# Patient Record
Sex: FEMALE | Race: WHITE | NOT HISPANIC OR LATINO
[De-identification: names, ages, dates, MRNs, and addresses within clinical notes are randomized per-mention and may not be internally consistent; named-entity substitution may affect disease eponyms.]

---

## 2017-06-20 ENCOUNTER — APPOINTMENT (OUTPATIENT)
Dept: HEART AND VASCULAR | Facility: CLINIC | Age: 72
End: 2017-06-20

## 2017-06-20 VITALS
BODY MASS INDEX: 30.83 KG/M2 | TEMPERATURE: 97.9 F | HEIGHT: 58.66 IN | OXYGEN SATURATION: 97 % | SYSTOLIC BLOOD PRESSURE: 132 MMHG | DIASTOLIC BLOOD PRESSURE: 76 MMHG | WEIGHT: 150.88 LBS | HEART RATE: 79 BPM

## 2017-06-20 DIAGNOSIS — Z80.9 FAMILY HISTORY OF MALIGNANT NEOPLASM, UNSPECIFIED: ICD-10-CM

## 2017-06-20 DIAGNOSIS — Z82.3 FAMILY HISTORY OF STROKE: ICD-10-CM

## 2017-06-20 DIAGNOSIS — Z87.891 PERSONAL HISTORY OF NICOTINE DEPENDENCE: ICD-10-CM

## 2017-06-20 RX ORDER — AZITHROMYCIN 250 MG/1
250 TABLET, FILM COATED ORAL
Qty: 6 | Refills: 0 | Status: DISCONTINUED | COMMUNITY
Start: 2017-06-13

## 2017-06-20 RX ORDER — AZITHROMYCIN 500 MG/1
500 TABLET, FILM COATED ORAL
Qty: 3 | Refills: 0 | Status: DISCONTINUED | COMMUNITY
Start: 2017-01-26

## 2017-07-20 ENCOUNTER — RX RENEWAL (OUTPATIENT)
Age: 72
End: 2017-07-20

## 2017-07-21 ENCOUNTER — RX RENEWAL (OUTPATIENT)
Age: 72
End: 2017-07-21

## 2017-12-15 ENCOUNTER — APPOINTMENT (OUTPATIENT)
Dept: HEART AND VASCULAR | Facility: CLINIC | Age: 72
End: 2017-12-15
Payer: MEDICARE

## 2017-12-15 VITALS
WEIGHT: 150 LBS | TEMPERATURE: 98.7 F | DIASTOLIC BLOOD PRESSURE: 85 MMHG | SYSTOLIC BLOOD PRESSURE: 140 MMHG | OXYGEN SATURATION: 98 % | HEIGHT: 58.66 IN | BODY MASS INDEX: 30.65 KG/M2 | HEART RATE: 79 BPM

## 2017-12-15 PROCEDURE — 93000 ELECTROCARDIOGRAM COMPLETE: CPT

## 2017-12-15 PROCEDURE — 99214 OFFICE O/P EST MOD 30 MIN: CPT | Mod: 25

## 2017-12-15 RX ORDER — ALBUTEROL SULFATE 90 UG/1
108 (90 BASE) AEROSOL, METERED RESPIRATORY (INHALATION)
Qty: 85 | Refills: 0 | Status: DISCONTINUED | COMMUNITY
Start: 2017-01-17 | End: 2017-12-15

## 2017-12-20 ENCOUNTER — APPOINTMENT (OUTPATIENT)
Dept: HEART AND VASCULAR | Facility: CLINIC | Age: 72
End: 2017-12-20
Payer: MEDICARE

## 2017-12-20 PROCEDURE — 93306 TTE W/DOPPLER COMPLETE: CPT

## 2017-12-20 PROCEDURE — 93225 XTRNL ECG REC<48 HRS REC: CPT

## 2017-12-20 PROCEDURE — ZZZZZ: CPT

## 2017-12-21 ENCOUNTER — RX RENEWAL (OUTPATIENT)
Age: 72
End: 2017-12-21

## 2017-12-21 DIAGNOSIS — Z87.09 PERSONAL HISTORY OF OTHER DISEASES OF THE RESPIRATORY SYSTEM: ICD-10-CM

## 2018-02-12 ENCOUNTER — RX RENEWAL (OUTPATIENT)
Age: 73
End: 2018-02-12

## 2018-07-06 ENCOUNTER — APPOINTMENT (OUTPATIENT)
Dept: HEART AND VASCULAR | Facility: CLINIC | Age: 73
End: 2018-07-06
Payer: MEDICARE

## 2018-07-06 VITALS
OXYGEN SATURATION: 98 % | BODY MASS INDEX: 30.04 KG/M2 | HEIGHT: 59.06 IN | DIASTOLIC BLOOD PRESSURE: 68 MMHG | HEART RATE: 77 BPM | TEMPERATURE: 97.8 F | SYSTOLIC BLOOD PRESSURE: 119 MMHG | WEIGHT: 148.99 LBS

## 2018-07-06 DIAGNOSIS — Z87.09 PERSONAL HISTORY OF OTHER DISEASES OF THE RESPIRATORY SYSTEM: ICD-10-CM

## 2018-07-06 PROCEDURE — 93000 ELECTROCARDIOGRAM COMPLETE: CPT

## 2018-07-06 PROCEDURE — 99213 OFFICE O/P EST LOW 20 MIN: CPT | Mod: 25

## 2018-07-06 RX ORDER — AZITHROMYCIN 250 MG/1
250 TABLET, FILM COATED ORAL
Qty: 6 | Refills: 0 | Status: DISCONTINUED | COMMUNITY
End: 2018-07-06

## 2018-07-19 ENCOUNTER — APPOINTMENT (OUTPATIENT)
Dept: HEART AND VASCULAR | Facility: CLINIC | Age: 73
End: 2018-07-19
Payer: MEDICARE

## 2018-07-19 VITALS
OXYGEN SATURATION: 98 % | HEIGHT: 58.86 IN | SYSTOLIC BLOOD PRESSURE: 130 MMHG | HEART RATE: 75 BPM | TEMPERATURE: 97.8 F | BODY MASS INDEX: 31.04 KG/M2 | DIASTOLIC BLOOD PRESSURE: 80 MMHG | WEIGHT: 154 LBS

## 2018-07-19 PROCEDURE — 99213 OFFICE O/P EST LOW 20 MIN: CPT

## 2018-09-26 ENCOUNTER — RX RENEWAL (OUTPATIENT)
Age: 73
End: 2018-09-26

## 2018-10-11 ENCOUNTER — RX RENEWAL (OUTPATIENT)
Age: 73
End: 2018-10-11

## 2018-10-11 RX ORDER — ALBUTEROL SULFATE 90 UG/1
108 (90 BASE) AEROSOL, METERED RESPIRATORY (INHALATION)
Qty: 3 | Refills: 3 | Status: ACTIVE | COMMUNITY
Start: 1900-01-01 | End: 1900-01-01

## 2018-11-29 ENCOUNTER — APPOINTMENT (OUTPATIENT)
Dept: HEART AND VASCULAR | Facility: CLINIC | Age: 73
End: 2018-11-29
Payer: MEDICARE

## 2018-11-29 VITALS
SYSTOLIC BLOOD PRESSURE: 150 MMHG | DIASTOLIC BLOOD PRESSURE: 100 MMHG | TEMPERATURE: 97.7 F | HEART RATE: 79 BPM | HEIGHT: 58.86 IN | OXYGEN SATURATION: 98 %

## 2018-11-29 PROCEDURE — 99214 OFFICE O/P EST MOD 30 MIN: CPT

## 2018-11-29 PROCEDURE — 93000 ELECTROCARDIOGRAM COMPLETE: CPT

## 2018-11-29 NOTE — HISTORY OF PRESENT ILLNESS
[FreeTextEntry1] : Sore throat x 1-2 days. Associated with dry cough, dry mouth, rhinitis, HA, no sinus pressure, ear pain. No c/o SOB, wheezing, nausea. Has seasonal allergies.\par \par \par Here 7/19/19 with anxiety, wheezing, sweats but not fever or chills.  Wants ProAir\par \par Endo- Dr Vizcaino\par GI- Dr Wynn

## 2018-11-29 NOTE — REASON FOR VISIT
[FreeTextEntry1] : 1 day of left arm pain in the armpit.  No radiation. No N/V. CP.  Pain lasted hrs, awoke much better.  Better with massage.  No arm pain today.  Walked here today without sxs.\par \par \par EKG: NSR, normal axis and intervals, PRWP, no ST-Tw abnormalities.11/29/18

## 2018-11-29 NOTE — PROCEDURE
[Normal] : 3. No pericardial effusion. [de-identified] : Dr. Derick Allen [de-identified] : Nicole Petri-Schoener, KELLIECS [de-identified] : palpitations [de-identified] : 1.2 [de-identified] : 1.2 [de-identified] : 3.3 [de-identified] : 2.3 [de-identified] : 3.3 [de-identified] : 2.9 [de-identified] : 21mmHG [de-identified] : 65-70% [de-identified] : Mild concentric LVH.  Grade I diastolic dysfucntion. [de-identified] : LA volume index: 17 ml/m2 [de-identified] : Mild TR. [de-identified] : IVC: 2.2 [de-identified] : Mild mitral and tricuspid regurgitation [FreeTextEntry1] : : 1945\par Age: 72y\par BP: 140/85\par Height: 61 inches\par Weight: 145 lbs\par BSA: 1.7 m2

## 2018-11-29 NOTE — ASSESSMENT
[FreeTextEntry1] : Arm Pain- sounds non cardiac but provokes a lot of anxiety.  EKG unchanged.  Will treat her elevated BP first and then consider a Stress test\par \par Sore throat: PE with injected throat,  Z jean, warm fluids, tea, cough drops, tylenol PRN for pain, hand washing, rest. Now with wheezing and tremendous anxiety about possibly having PNA.  Will renew ProAir.  Her immunizations are up to date.\par \par HTN- continue Lisinopril 30mg, BP way up at 170/90, adding Norvasc 5mg\par \par HLD- Continue statin

## 2018-11-29 NOTE — PHYSICAL EXAM
[General Appearance - Well Developed] : well developed [Normal Appearance] : normal appearance [Well Groomed] : well groomed [General Appearance - Well Nourished] : well nourished [No Deformities] : no deformities [General Appearance - In No Acute Distress] : no acute distress [Normal Conjunctiva] : the conjunctiva exhibited no abnormalities [Eyelids - No Xanthelasma] : the eyelids demonstrated no xanthelasmas [Normal Oral Mucosa] : normal oral mucosa [No Oral Pallor] : no oral pallor [No Oral Cyanosis] : no oral cyanosis [Normal Oropharynx] : normal oropharynx [Respiration, Rhythm And Depth] : normal respiratory rhythm and effort [Exaggerated Use Of Accessory Muscles For Inspiration] : no accessory muscle use [Auscultation Breath Sounds / Voice Sounds] : lungs were clear to auscultation bilaterally [Heart Rate And Rhythm] : heart rate and rhythm were normal [Heart Sounds] : normal S1 and S2 [Murmurs] : no murmurs present [Abdomen Soft] : soft [Abdomen Tenderness] : non-tender [Abdomen Mass (___ Cm)] : no abdominal mass palpated [Abnormal Walk] : normal gait [Gait - Sufficient For Exercise Testing] : the gait was sufficient for exercise testing [Nail Clubbing] : no clubbing of the fingernails [Cyanosis, Localized] : no localized cyanosis [Petechial Hemorrhages (___cm)] : no petechial hemorrhages [Skin Color & Pigmentation] : normal skin color and pigmentation [] : no rash [No Venous Stasis] : no venous stasis [Skin Lesions] : no skin lesions [No Skin Ulcers] : no skin ulcer [No Xanthoma] : no  xanthoma was observed [FreeTextEntry1] : mobile LNs

## 2018-11-29 NOTE — IMPRESSION
[FreeTextEntry1] : Normal Ejection Fraction and wall motion\par Mild mitral and tricuspid regurgitation\par Study similar to one from 8/31/2015

## 2018-12-14 ENCOUNTER — APPOINTMENT (OUTPATIENT)
Dept: HEART AND VASCULAR | Facility: CLINIC | Age: 73
End: 2018-12-14
Payer: MEDICARE

## 2018-12-14 VITALS — DIASTOLIC BLOOD PRESSURE: 90 MMHG | SYSTOLIC BLOOD PRESSURE: 144 MMHG

## 2018-12-14 VITALS
WEIGHT: 148.99 LBS | BODY MASS INDEX: 30.04 KG/M2 | DIASTOLIC BLOOD PRESSURE: 90 MMHG | TEMPERATURE: 98.1 F | HEIGHT: 58.86 IN | SYSTOLIC BLOOD PRESSURE: 150 MMHG | HEART RATE: 84 BPM | OXYGEN SATURATION: 97 %

## 2018-12-14 PROCEDURE — G0008: CPT

## 2018-12-14 PROCEDURE — 99214 OFFICE O/P EST MOD 30 MIN: CPT

## 2018-12-14 PROCEDURE — 90662 IIV NO PRSV INCREASED AG IM: CPT

## 2018-12-14 PROCEDURE — 93000 ELECTROCARDIOGRAM COMPLETE: CPT

## 2018-12-14 NOTE — ASSESSMENT
[FreeTextEntry1] : Arm Pain- sounds non cardiac but provokes a lot of anxiety.  EKG unchanged.  Will treat her elevated BP first and then consider a Stress test\par \par Palpitations- had either sinus tachycardia or SVT.  Pt told to avoid stressful situations and told not to fly since it is triggering HTN, palps and severe anxiety.  Will add Inderal 10mg for PRN use only.\par \par Sore throat: PE with injected throat,  Z jean, warm fluids, tea, cough drops, tylenol PRN for pain, hand washing, rest. Now with wheezing and tremendous anxiety about possibly having PNA.  Will renew ProAir.  Her immunizations are up to date.\par \par HTN- continue Lisinopril 30mg, BP way up at 170/90, adding Norvasc 5mg.  Gets very anxious, BP still not perfect\par \par HLD- Continue statin

## 2018-12-14 NOTE — PROCEDURE
[Normal] : 3. No pericardial effusion. [de-identified] : Dr. Derick Allen [de-identified] : Nicole Petri-Schoener, KELLIECS [de-identified] : palpitations [de-identified] : 1.2 [de-identified] : 1.2 [de-identified] : 3.3 [de-identified] : 2.3 [de-identified] : 3.3 [de-identified] : 2.9 [de-identified] : 21mmHG [de-identified] : 65-70% [de-identified] : Mild concentric LVH.  Grade I diastolic dysfucntion. [de-identified] : LA volume index: 17 ml/m2 [de-identified] : Mild TR. [de-identified] : IVC: 2.2 [de-identified] : Mild mitral and tricuspid regurgitation [FreeTextEntry1] : : 1945\par Age: 72y\par BP: 140/85\par Height: 61 inches\par Weight: 145 lbs\par BSA: 1.7 m2

## 2018-12-14 NOTE — HISTORY OF PRESENT ILLNESS
[FreeTextEntry1] : Sore throat x 1-2 days. Associated with dry cough, dry mouth, rhinitis, HA, no sinus pressure, ear pain. No c/o SOB, wheezing, nausea. Has seasonal allergies.\par 12/14/18  Upset about an upcoming flight, getting palps and SOB anticipating problems with her knees and hips.  BP up.\par \par \par Here 7/19/19 with anxiety, wheezing, sweats but not fever or chills.  Wants ProAir\par \par Endo- Dr Vizcaino\par GI- Dr Wynn

## 2018-12-14 NOTE — PHYSICAL EXAM
[General Appearance - Well Developed] : well developed [Normal Appearance] : normal appearance [Well Groomed] : well groomed [General Appearance - Well Nourished] : well nourished [No Deformities] : no deformities [General Appearance - In No Acute Distress] : no acute distress [Normal Conjunctiva] : the conjunctiva exhibited no abnormalities [Eyelids - No Xanthelasma] : the eyelids demonstrated no xanthelasmas [Normal Oral Mucosa] : normal oral mucosa [No Oral Pallor] : no oral pallor [No Oral Cyanosis] : no oral cyanosis [Normal Oropharynx] : normal oropharynx [Respiration, Rhythm And Depth] : normal respiratory rhythm and effort [Exaggerated Use Of Accessory Muscles For Inspiration] : no accessory muscle use [Auscultation Breath Sounds / Voice Sounds] : lungs were clear to auscultation bilaterally [Heart Rate And Rhythm] : heart rate and rhythm were normal [Heart Sounds] : normal S1 and S2 [Murmurs] : no murmurs present [Abdomen Soft] : soft [Abdomen Tenderness] : non-tender [Abdomen Mass (___ Cm)] : no abdominal mass palpated [Abnormal Walk] : normal gait [Gait - Sufficient For Exercise Testing] : the gait was sufficient for exercise testing [Nail Clubbing] : no clubbing of the fingernails [Cyanosis, Localized] : no localized cyanosis [Petechial Hemorrhages (___cm)] : no petechial hemorrhages [Skin Color & Pigmentation] : normal skin color and pigmentation [] : no rash [No Venous Stasis] : no venous stasis [Skin Lesions] : no skin lesions [No Skin Ulcers] : no skin ulcer [No Xanthoma] : no  xanthoma was observed [Oriented To Time, Place, And Person] : oriented to person, place, and time [Affect] : the affect was normal [FreeTextEntry1] : Anxious

## 2019-01-30 ENCOUNTER — RX RENEWAL (OUTPATIENT)
Age: 74
End: 2019-01-30

## 2019-03-25 ENCOUNTER — APPOINTMENT (OUTPATIENT)
Dept: HEART AND VASCULAR | Facility: CLINIC | Age: 74
End: 2019-03-25

## 2019-07-08 ENCOUNTER — APPOINTMENT (OUTPATIENT)
Dept: HEART AND VASCULAR | Facility: CLINIC | Age: 74
End: 2019-07-08
Payer: MEDICARE

## 2019-07-08 VITALS
BODY MASS INDEX: 27.29 KG/M2 | HEART RATE: 83 BPM | DIASTOLIC BLOOD PRESSURE: 80 MMHG | OXYGEN SATURATION: 96 % | WEIGHT: 130 LBS | SYSTOLIC BLOOD PRESSURE: 130 MMHG | HEIGHT: 58 IN | TEMPERATURE: 98.4 F

## 2019-07-08 PROCEDURE — 93000 ELECTROCARDIOGRAM COMPLETE: CPT | Mod: NC

## 2019-07-08 PROCEDURE — 36415 COLL VENOUS BLD VENIPUNCTURE: CPT

## 2019-07-08 PROCEDURE — 99214 OFFICE O/P EST MOD 30 MIN: CPT

## 2019-07-08 RX ORDER — AZITHROMYCIN DIHYDRATE 250 MG/1
250 TABLET, FILM COATED ORAL
Refills: 0 | Status: DISCONTINUED | COMMUNITY
End: 2019-07-08

## 2019-07-08 RX ORDER — OSELTAMIVIR PHOSPHATE 75 MG/1
75 CAPSULE ORAL TWICE DAILY
Qty: 10 | Refills: 0 | Status: DISCONTINUED | COMMUNITY
Start: 2017-12-21 | End: 2019-07-08

## 2019-07-08 RX ORDER — PROPRANOLOL HYDROCHLORIDE 10 MG/1
10 TABLET ORAL
Qty: 30 | Refills: 3 | Status: DISCONTINUED | COMMUNITY
End: 2019-07-08

## 2019-07-08 RX ORDER — AZITHROMYCIN 250 MG/1
250 TABLET, FILM COATED ORAL
Qty: 6 | Refills: 0 | Status: DISCONTINUED | COMMUNITY
Start: 2018-07-06 | End: 2019-07-08

## 2019-07-08 RX ORDER — AMLODIPINE BESYLATE 5 MG/1
5 TABLET ORAL
Qty: 90 | Refills: 3 | Status: DISCONTINUED | COMMUNITY
End: 2019-07-08

## 2019-07-08 NOTE — PHYSICAL EXAM
[General Appearance - Well Developed] : well developed [General Appearance - Well Nourished] : well nourished [Well Groomed] : well groomed [Normal Appearance] : normal appearance [General Appearance - In No Acute Distress] : no acute distress [No Deformities] : no deformities [Normal Conjunctiva] : the conjunctiva exhibited no abnormalities [Normal Oral Mucosa] : normal oral mucosa [Eyelids - No Xanthelasma] : the eyelids demonstrated no xanthelasmas [No Oral Pallor] : no oral pallor [No Oral Cyanosis] : no oral cyanosis [Normal Jugular Venous V Waves Present] : normal jugular venous V waves present [Normal Jugular Venous A Waves Present] : normal jugular venous A waves present [Respiration, Rhythm And Depth] : normal respiratory rhythm and effort [No Jugular Venous Welch A Waves] : no jugular venous welch A waves [Heart Sounds] : normal S1 and S2 [Heart Rate And Rhythm] : heart rate and rhythm were normal [Auscultation Breath Sounds / Voice Sounds] : lungs were clear to auscultation bilaterally [Exaggerated Use Of Accessory Muscles For Inspiration] : no accessory muscle use [Murmurs] : no murmurs present [Abdomen Soft] : soft [Abdomen Tenderness] : non-tender [Abnormal Walk] : normal gait [Abdomen Mass (___ Cm)] : no abdominal mass palpated [Cyanosis, Localized] : no localized cyanosis [Nail Clubbing] : no clubbing of the fingernails [Gait - Sufficient For Exercise Testing] : the gait was sufficient for exercise testing [Oriented To Time, Place, And Person] : oriented to person, place, and time [] : no ischemic changes [Petechial Hemorrhages (___cm)] : no petechial hemorrhages [Affect] : the affect was normal [No Anxiety] : not feeling anxious [Mood] : the mood was normal

## 2019-07-09 LAB
25(OH)D3 SERPL-MCNC: 24.1 NG/ML
ALBUMIN SERPL ELPH-MCNC: 4.2 G/DL
ALP BLD-CCNC: 110 U/L
ALT SERPL-CCNC: 15 U/L
ANION GAP SERPL CALC-SCNC: 12 MMOL/L
APPEARANCE: CLEAR
APTT BLD: 30.7 SEC
AST SERPL-CCNC: 16 U/L
BACTERIA: NEGATIVE
BASOPHILS # BLD AUTO: 0.03 K/UL
BASOPHILS NFR BLD AUTO: 0.6 %
BILIRUB SERPL-MCNC: 0.4 MG/DL
BILIRUBIN URINE: NEGATIVE
BLOOD URINE: NEGATIVE
BUN SERPL-MCNC: 16 MG/DL
CALCIUM SERPL-MCNC: 9.4 MG/DL
CHLORIDE SERPL-SCNC: 105 MMOL/L
CHOLEST SERPL-MCNC: 138 MG/DL
CHOLEST/HDLC SERPL: 3.1 RATIO
CO2 SERPL-SCNC: 24 MMOL/L
COLOR: COLORLESS
CREAT SERPL-MCNC: 0.59 MG/DL
EOSINOPHIL # BLD AUTO: 0.05 K/UL
EOSINOPHIL NFR BLD AUTO: 0.9 %
ESTIMATED AVERAGE GLUCOSE: 117 MG/DL
GLUCOSE QUALITATIVE U: NEGATIVE
GLUCOSE SERPL-MCNC: 144 MG/DL
HBA1C MFR BLD HPLC: 5.7 %
HCT VFR BLD CALC: 41.5 %
HDLC SERPL-MCNC: 44 MG/DL
HGB BLD-MCNC: 12.8 G/DL
HYALINE CASTS: 1 /LPF
IMM GRANULOCYTES NFR BLD AUTO: 0.2 %
INR PPP: 1 RATIO
KETONES URINE: NEGATIVE
LDLC SERPL CALC-MCNC: 84 MG/DL
LEUKOCYTE ESTERASE URINE: NEGATIVE
LYMPHOCYTES # BLD AUTO: 1.82 K/UL
LYMPHOCYTES NFR BLD AUTO: 34.1 %
MAN DIFF?: NORMAL
MCHC RBC-ENTMCNC: 28.9 PG
MCHC RBC-ENTMCNC: 30.8 GM/DL
MCV RBC AUTO: 93.7 FL
MICROSCOPIC-UA: NORMAL
MONOCYTES # BLD AUTO: 0.46 K/UL
MONOCYTES NFR BLD AUTO: 8.6 %
NEUTROPHILS # BLD AUTO: 2.96 K/UL
NEUTROPHILS NFR BLD AUTO: 55.6 %
NITRITE URINE: NEGATIVE
PH URINE: 6
PLATELET # BLD AUTO: 236 K/UL
POTASSIUM SERPL-SCNC: 4.3 MMOL/L
PROT SERPL-MCNC: 6.6 G/DL
PROTEIN URINE: NEGATIVE
PT BLD: 11.4 SEC
RBC # BLD: 4.43 M/UL
RBC # FLD: 12.8 %
RED BLOOD CELLS URINE: 1 /HPF
SODIUM SERPL-SCNC: 141 MMOL/L
SPECIFIC GRAVITY URINE: 1.01
SQUAMOUS EPITHELIAL CELLS: 0 /HPF
TRIGL SERPL-MCNC: 51 MG/DL
TSH SERPL-ACNC: 0.88 UIU/ML
UROBILINOGEN URINE: NORMAL
WBC # FLD AUTO: 5.33 K/UL
WHITE BLOOD CELLS URINE: 1 /HPF

## 2019-12-18 ENCOUNTER — RX RENEWAL (OUTPATIENT)
Age: 74
End: 2019-12-18

## 2020-03-20 ENCOUNTER — RX RENEWAL (OUTPATIENT)
Age: 75
End: 2020-03-20

## 2020-12-15 PROBLEM — Z87.09 HISTORY OF INFLUENZA: Status: RESOLVED | Noted: 2017-12-21 | Resolved: 2020-12-15

## 2020-12-16 PROBLEM — Z87.09 HISTORY OF SORE THROAT: Status: RESOLVED | Noted: 2017-06-20 | Resolved: 2020-12-16

## 2021-04-21 ENCOUNTER — APPOINTMENT (OUTPATIENT)
Dept: INTERNAL MEDICINE | Facility: CLINIC | Age: 76
End: 2021-04-21
Payer: MEDICARE

## 2021-04-21 VITALS
HEART RATE: 79 BPM | DIASTOLIC BLOOD PRESSURE: 76 MMHG | HEIGHT: 58 IN | WEIGHT: 142 LBS | SYSTOLIC BLOOD PRESSURE: 135 MMHG | BODY MASS INDEX: 29.81 KG/M2

## 2021-04-21 DIAGNOSIS — Z86.79 PERSONAL HISTORY OF OTHER DISEASES OF THE CIRCULATORY SYSTEM: ICD-10-CM

## 2021-04-21 DIAGNOSIS — R07.89 OTHER CHEST PAIN: ICD-10-CM

## 2021-04-21 DIAGNOSIS — R23.2 FLUSHING: ICD-10-CM

## 2021-04-21 DIAGNOSIS — M79.603 PAIN IN ARM, UNSPECIFIED: ICD-10-CM

## 2021-04-21 DIAGNOSIS — Z01.818 ENCOUNTER FOR OTHER PREPROCEDURAL EXAMINATION: ICD-10-CM

## 2021-04-21 DIAGNOSIS — R41.3 OTHER AMNESIA: ICD-10-CM

## 2021-04-21 DIAGNOSIS — Z92.89 PERSONAL HISTORY OF OTHER MEDICAL TREATMENT: ICD-10-CM

## 2021-04-21 DIAGNOSIS — Z87.898 PERSONAL HISTORY OF OTHER SPECIFIED CONDITIONS: ICD-10-CM

## 2021-04-21 DIAGNOSIS — M25.559 PAIN IN UNSPECIFIED HIP: ICD-10-CM

## 2021-04-21 DIAGNOSIS — Z78.9 OTHER SPECIFIED HEALTH STATUS: ICD-10-CM

## 2021-04-21 DIAGNOSIS — R06.2 WHEEZING: ICD-10-CM

## 2021-04-21 DIAGNOSIS — M12.9 ARTHROPATHY, UNSPECIFIED: ICD-10-CM

## 2021-04-21 PROCEDURE — G2212 PROLONG OUTPT/OFFICE VIS: CPT

## 2021-04-21 PROCEDURE — 99205 OFFICE O/P NEW HI 60 MIN: CPT | Mod: 25

## 2021-04-23 PROBLEM — R41.3 MEMORY PROBLEM: Status: ACTIVE | Noted: 2021-04-23

## 2021-04-23 PROBLEM — Z87.898 HISTORY OF PALPITATIONS: Status: RESOLVED | Noted: 2017-12-15 | Resolved: 2021-04-23

## 2021-04-23 PROBLEM — R06.2 WHEEZING ON EXPIRATION: Status: RESOLVED | Noted: 2018-07-19 | Resolved: 2021-04-23

## 2021-04-23 PROBLEM — Z92.89 HISTORY OF MAMMOGRAM: Status: ACTIVE | Noted: 2021-04-23

## 2021-04-23 PROBLEM — Z01.818 PRE-OP EXAM: Status: RESOLVED | Noted: 2019-07-08 | Resolved: 2021-04-23

## 2021-04-23 PROBLEM — Z87.898 HISTORY OF IMPAIRED GLUCOSE TOLERANCE: Status: RESOLVED | Noted: 2017-12-15 | Resolved: 2021-04-23

## 2021-04-23 PROBLEM — M12.9 ARTHRITIS, MULTIPLE JOINT INVOLVEMENT: Status: ACTIVE | Noted: 2021-04-23

## 2021-04-23 PROBLEM — R07.89 NON-CARDIAC CHEST PAIN: Status: ACTIVE | Noted: 2021-04-23

## 2021-04-23 PROBLEM — M79.603 ARM PAIN: Status: RESOLVED | Noted: 2018-11-29 | Resolved: 2021-04-23

## 2021-04-23 PROBLEM — Z78.9 HEALTH MAINTENANCE ALTERATION: Status: ACTIVE | Noted: 2021-04-23

## 2021-04-23 PROBLEM — R23.2 FLUSHING: Status: RESOLVED | Noted: 2021-04-23 | Resolved: 2021-04-23

## 2021-04-23 NOTE — PHYSICAL EXAM
[No Acute Distress] : no acute distress [Normal Sclera/Conjunctiva] : normal sclera/conjunctiva [No Respiratory Distress] : no respiratory distress  [Clear to Auscultation] : lungs were clear to auscultation bilaterally [Normal Rate] : normal rate  [Regular Rhythm] : with a regular rhythm [No Edema] : there was no peripheral edema [Soft] : abdomen soft [Non Tender] : non-tender [No HSM] : no HSM [Normal Bowel Sounds] : normal bowel sounds [No Spinal Tenderness] : no spinal tenderness [Grossly Normal Strength/Tone] : grossly normal strength/tone [No Skin Lesions] : no skin lesions [Speech Grossly Normal] : speech grossly normal [Alert and Oriented x3] : oriented to person, place, and time [de-identified] : dry tongue, normal mucosal; good dental re[air [de-identified] : stiff, arthritic; /neck [de-identified] : soft 1/6 HSM mostly at AV valve level [de-identified] : stiff low back [de-identified] : thin hair- female pattern  [de-identified] : slower stiff gait [de-identified] : disorganized thoughts, focus, insights

## 2021-04-23 NOTE — ASSESSMENT
[FreeTextEntry1] : Extremely difficult encounter- she is vague, never answers questions directly. Even on repeated questioning, gives unrelated and non-informative answers. At best, when tightly directed, she, answers possibly reliably,  in agreement to what I have concluded, but even then is unsure. Remarkable, if true, that she is actively practicing. Further annoying is getting information. Call to pharmacy (on hold for 40 min) and then told "because of HIPPA" this one pharmacy won't fax list of meds but a list can be picked up. When I asked the patient to pick them up or send her daughter, no answe other than a sigh - "will see."  In order to complete this note, spent hours reading through her crumpled disorganized notes and medical records I requested from other MDs whose names I could find.

## 2021-04-23 NOTE — HISTORY OF PRESENT ILLNESS
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 She has several medical conditions including:\par 1. Atypical Cp - uncertain how long but in last 5 mo had reportedly neg cardiac evalluation\par 2. HTN \par 3. l/s Hypothyroidism\par 4. arthritis back, hips,  and knees\par 5. functional constipation and what sounds like functional bowel syndrome\par 6. "Memory Problems"\par 7. Hyperlipidemia

## 2021-04-23 NOTE — REVIEW OF SYSTEMS
[Patient Intake Form Reviewed] : Patient intake form was reviewed [FreeTextEntry2] : not a clear historian despite extra time

## 2021-04-23 NOTE — PLAN
[FreeTextEntry1] : When I get all the information, I will ask the patient to return to update or complete missing parts of the story. I asked patient to get records from some of the doctors - to which her response is "can't you do it?" Perhaps this is because she has trouble communicating with others - perhaps other personality factors are at play. Further impression can be derived from entries of papers she brought (including to do list from years ago, fax record tabulations - see Chart Notes 4.21.21), other records brought today scanned in"Consult/referral 4.21.21" and reading comments of consult from neurologist, listed under consult 06/02/2017 and orthopedisy from 11.21.16.\par Perhaps the best approach is to focus on 1-2 problems at a time instead of the myriad subjects she floated between. Medically stable - focus should be on \par 1. Assess memory testing\par 2. Other neuro psych testing\par 3. Health maintenance -mammo, dexa\par 4. Let GI, Endo, and Cardio deal with whatever they are doing for he.r

## 2021-05-19 RX ORDER — AMLODIPINE BESYLATE 2.5 MG/1
2.5 TABLET ORAL DAILY
Qty: 1 | Refills: 3 | Status: DISCONTINUED | COMMUNITY
Start: 2021-04-21 | End: 2021-05-19

## 2021-06-01 ENCOUNTER — APPOINTMENT (OUTPATIENT)
Dept: INTERNAL MEDICINE | Facility: CLINIC | Age: 76
End: 2021-06-01
Payer: MEDICARE

## 2021-06-01 VITALS — DIASTOLIC BLOOD PRESSURE: 77 MMHG | SYSTOLIC BLOOD PRESSURE: 129 MMHG | HEART RATE: 65 BPM

## 2021-06-01 DIAGNOSIS — R06.02 SHORTNESS OF BREATH: ICD-10-CM

## 2021-06-01 DIAGNOSIS — K59.04 CHRONIC IDIOPATHIC CONSTIPATION: ICD-10-CM

## 2021-06-01 DIAGNOSIS — M17.11 UNILATERAL PRIMARY OSTEOARTHRITIS, RIGHT KNEE: ICD-10-CM

## 2021-06-01 DIAGNOSIS — K31.9 DISEASE OF STOMACH AND DUODENUM, UNSPECIFIED: ICD-10-CM

## 2021-06-01 PROCEDURE — 99215 OFFICE O/P EST HI 40 MIN: CPT

## 2021-06-02 PROBLEM — K59.04 FUNCTIONAL CONSTIPATION: Status: ACTIVE | Noted: 2021-06-02

## 2021-06-02 PROBLEM — R06.02 EXERTIONAL SHORTNESS OF BREATH: Status: ACTIVE | Noted: 2021-06-02

## 2021-06-02 PROBLEM — K31.9: Status: ACTIVE | Noted: 2021-04-23

## 2021-06-02 RX ORDER — PRAVASTATIN SODIUM 20 MG/1
20 TABLET ORAL
Qty: 90 | Refills: 0 | Status: COMPLETED | COMMUNITY
Start: 2021-03-25

## 2021-06-02 RX ORDER — FLUTICASONE PROPIONATE 50 UG/1
50 SPRAY, METERED NASAL
Qty: 16 | Refills: 0 | Status: COMPLETED | COMMUNITY
Start: 2020-12-18

## 2021-06-02 RX ORDER — LORAZEPAM 0.5 MG/1
0.5 TABLET ORAL
Qty: 3 | Refills: 0 | Status: COMPLETED | COMMUNITY
Start: 2020-12-07

## 2021-06-02 NOTE — HISTORY OF PRESENT ILLNESS
[FreeTextEntry1] : 1." stomach bothering due to the pills Im taking"\par 2. short of breath on traveling and walking here today\par 3."other things - I have a list" [de-identified] : 1. Amlodipine bothered her in"every way" HA, stomach, dizzy. Stopped it and sxs cleared. BP was normal on last visit without. (this is a repetition of previous discussion). In fact, amlodipine stopped at least 4+ months ago per pharmacy records.\par 2. Atorvastatin didn't bother her in the same way and she was OK with it but stopped it maybe 1 month ago. No clear reason given. Starts to mix atorvastatin with amlodipine and mixes sxs she attributes to them and unrelated chronic conditions, such as her functional constipation. \par 3. Hard to follow what was said to her by which doctors, when, and for what reasons. Reviewed the entries below for updates and corrections, as best as I can determine. \par 4. Raises question of bone density but doesn't have old reports and isn't inclined to take medicine for this - she will defer for now.\par 5. "Arthritis in Right hip and may be knee - seen by multiple Orthopedists in the past.

## 2021-06-02 NOTE — PLAN
[FreeTextEntry1] : written instructions given for management of BP, cholesterol, constipation, epigastric sxs. In addition, explained chronic knee and dyspnea sensations and suggested return to previously seenMDs who know her from past and presumably have objective documentation to work form, thereby avoiding waste of repeating studies.

## 2021-06-02 NOTE — REVIEW OF SYSTEMS
[Patient Intake Form Reviewed] : Patient intake form was reviewed [Shortness Of Breath] : shortness of breath [Joint Pain] : joint pain [Memory Loss] : memory loss [Negative] : Heme/Lymph [FreeTextEntry2] : not a clear historian despite extra time [FreeTextEntry7] : see problem list

## 2021-06-02 NOTE — PHYSICAL EXAM
[Normal Sclera/Conjunctiva] : normal sclera/conjunctiva [No Respiratory Distress] : no respiratory distress  [Clear to Auscultation] : lungs were clear to auscultation bilaterally [Normal] : normal rate, regular rhythm, normal S1 and S2 and no murmur heard [No Edema] : there was no peripheral edema [Soft] : abdomen soft [Non Tender] : non-tender [Non-distended] : non-distended [No Masses] : no abdominal mass palpated [No HSM] : no HSM [Normal Bowel Sounds] : normal bowel sounds [No Spinal Tenderness] : no spinal tenderness [No Skin Lesions] : no skin lesions [No Focal Deficits] : no focal deficits [de-identified] : unchanged appearance; bags with papers and notes; no medical distress [de-identified] : gait abnormal with stiffer r knee and outturned right leg; no acute knee changes or tenderness on exam; R>>L bony prominence [de-identified] : somewhat frustrated flummoxed demeanor with perhaps a reflexive or underlying aggressive edge

## 2021-09-15 ENCOUNTER — APPOINTMENT (OUTPATIENT)
Dept: FAMILY MEDICINE | Facility: CLINIC | Age: 76
End: 2021-09-15
Payer: MEDICARE

## 2021-09-15 VITALS
HEIGHT: 58 IN | BODY MASS INDEX: 28.34 KG/M2 | HEART RATE: 68 BPM | DIASTOLIC BLOOD PRESSURE: 81 MMHG | SYSTOLIC BLOOD PRESSURE: 132 MMHG | WEIGHT: 135 LBS | OXYGEN SATURATION: 95 % | TEMPERATURE: 98.1 F

## 2021-09-15 DIAGNOSIS — K30 FUNCTIONAL DYSPEPSIA: ICD-10-CM

## 2021-09-15 DIAGNOSIS — R46.89 OTHER SYMPTOMS AND SIGNS INVOLVING APPEARANCE AND BEHAVIOR: ICD-10-CM

## 2021-09-15 PROCEDURE — 36415 COLL VENOUS BLD VENIPUNCTURE: CPT

## 2021-09-15 PROCEDURE — 99204 OFFICE O/P NEW MOD 45 MIN: CPT | Mod: 25

## 2021-09-15 RX ORDER — LEVOTHYROXINE SODIUM 88 UG/1
88 TABLET ORAL DAILY
Qty: 90 | Refills: 0 | Status: ACTIVE | COMMUNITY
Start: 2016-11-21

## 2021-09-15 RX ORDER — ASPIRIN ENTERIC COATED TABLETS 81 MG 81 MG/1
81 TABLET, DELAYED RELEASE ORAL DAILY
Qty: 90 | Refills: 3 | Status: ACTIVE | COMMUNITY
Start: 2021-05-19

## 2021-09-15 RX ORDER — OMEPRAZOLE 40 MG/1
40 CAPSULE, DELAYED RELEASE ORAL
Qty: 1 | Refills: 3 | Status: ACTIVE | COMMUNITY
Start: 2021-06-01

## 2021-09-15 RX ORDER — ATORVASTATIN CALCIUM 20 MG/1
20 TABLET, FILM COATED ORAL
Qty: 1 | Refills: 3 | Status: ACTIVE | COMMUNITY
Start: 2021-06-01 | End: 1900-01-01

## 2021-09-15 NOTE — PLAN
[FreeTextEntry1] : follow up labs, labs drawn in office \par \par behavioral changes \par ? mental/ behavioral status -- has been occuring over the course of 4 years \par mentioned brother passed ? months ago, feels like she is stuggling \par will send to neuropsych

## 2021-09-15 NOTE — PHYSICAL EXAM
[Normal Sclera/Conjunctiva] : normal sclera/conjunctiva [Normal Outer Ear/Nose] : the outer ears and nose were normal in appearance [Normal] : no joint swelling and grossly normal strength and tone [Normal Mood] : the mood was normal [de-identified] : ? memory  [de-identified] : ? insight

## 2021-09-15 NOTE — HISTORY OF PRESENT ILLNESS
[FreeTextEntry8] : cc: hot at night \par \par 77 yo f presents to discuss waking up hot at night. \par Hx of hypothyroid, high blood pressure, high cholesterol. Takes meds daily. \par Mentioned difficulty with memory. Forgets names. Mentioned remembers directions, when cooking. \par Mentioned works as a psychotherapist. Mentioned overwhelmed when she has too much to do, and becomes forgetful. \par \par Patient is vague, unable to clearly answer questions, variety of thoughts. \par

## 2021-09-20 ENCOUNTER — NON-APPOINTMENT (OUTPATIENT)
Age: 76
End: 2021-09-20

## 2021-09-20 LAB
25(OH)D3 SERPL-MCNC: 22.8 NG/ML
ALBUMIN SERPL ELPH-MCNC: 4.5 G/DL
ALP BLD-CCNC: 153 U/L
ALT SERPL-CCNC: 14 U/L
ANION GAP SERPL CALC-SCNC: 22 MMOL/L
AST SERPL-CCNC: 20 U/L
BASOPHILS # BLD AUTO: 0.04 K/UL
BASOPHILS NFR BLD AUTO: 0.6 %
BILIRUB SERPL-MCNC: 0.4 MG/DL
BUN SERPL-MCNC: 17 MG/DL
CALCIUM SERPL-MCNC: 9.5 MG/DL
CHLORIDE SERPL-SCNC: 99 MMOL/L
CHOLEST SERPL-MCNC: 170 MG/DL
CO2 SERPL-SCNC: 20 MMOL/L
CREAT SERPL-MCNC: 0.66 MG/DL
EOSINOPHIL # BLD AUTO: 0.1 K/UL
EOSINOPHIL NFR BLD AUTO: 1.6 %
ESTIMATED AVERAGE GLUCOSE: 131 MG/DL
FOLATE SERPL-MCNC: 12 NG/ML
GLUCOSE SERPL-MCNC: 31 MG/DL
HBA1C MFR BLD HPLC: 6.2 %
HCT VFR BLD CALC: 42.7 %
HDLC SERPL-MCNC: 49 MG/DL
HGB BLD-MCNC: 12.6 G/DL
IMM GRANULOCYTES NFR BLD AUTO: 0 %
LDLC SERPL CALC-MCNC: 97 MG/DL
LYMPHOCYTES # BLD AUTO: 2.13 K/UL
LYMPHOCYTES NFR BLD AUTO: 33.5 %
MAN DIFF?: NORMAL
MCHC RBC-ENTMCNC: 27.5 PG
MCHC RBC-ENTMCNC: 29.5 GM/DL
MCV RBC AUTO: 93 FL
MONOCYTES # BLD AUTO: 0.53 K/UL
MONOCYTES NFR BLD AUTO: 8.3 %
NEUTROPHILS # BLD AUTO: 3.55 K/UL
NEUTROPHILS NFR BLD AUTO: 56 %
NONHDLC SERPL-MCNC: 121 MG/DL
PLATELET # BLD AUTO: 305 K/UL
POTASSIUM SERPL-SCNC: 4.6 MMOL/L
PROT SERPL-MCNC: 7.2 G/DL
RBC # BLD: 4.59 M/UL
RBC # FLD: 14.2 %
SODIUM SERPL-SCNC: 142 MMOL/L
TRIGL SERPL-MCNC: 121 MG/DL
TSH SERPL-ACNC: 1.25 UIU/ML
VIT B12 SERPL-MCNC: 464 PG/ML
WBC # FLD AUTO: 6.35 K/UL

## 2021-09-30 ENCOUNTER — APPOINTMENT (OUTPATIENT)
Dept: HEART AND VASCULAR | Facility: CLINIC | Age: 76
End: 2021-09-30

## 2021-11-01 ENCOUNTER — APPOINTMENT (OUTPATIENT)
Dept: INTERNAL MEDICINE | Facility: CLINIC | Age: 76
End: 2021-11-01

## 2021-12-09 ENCOUNTER — APPOINTMENT (OUTPATIENT)
Dept: NEUROLOGY | Facility: CLINIC | Age: 76
End: 2021-12-09

## 2022-11-15 ENCOUNTER — APPOINTMENT (OUTPATIENT)
Dept: INTERNAL MEDICINE | Facility: CLINIC | Age: 77
End: 2022-11-15

## 2022-11-15 VITALS
DIASTOLIC BLOOD PRESSURE: 70 MMHG | BODY MASS INDEX: 28.71 KG/M2 | HEIGHT: 58 IN | SYSTOLIC BLOOD PRESSURE: 121 MMHG | TEMPERATURE: 98.4 F | WEIGHT: 136.8 LBS | HEART RATE: 68 BPM | OXYGEN SATURATION: 98 %

## 2022-11-15 DIAGNOSIS — D51.9 VITAMIN B12 DEFICIENCY ANEMIA, UNSPECIFIED: ICD-10-CM

## 2022-11-15 DIAGNOSIS — Z86.39 PERSONAL HISTORY OF OTHER ENDOCRINE, NUTRITIONAL AND METABOLIC DISEASE: ICD-10-CM

## 2022-11-15 DIAGNOSIS — I25.10 ATHEROSCLEROTIC HEART DISEASE OF NATIVE CORONARY ARTERY W/OUT ANGINA PECTORIS: ICD-10-CM

## 2022-11-15 DIAGNOSIS — I10 ESSENTIAL (PRIMARY) HYPERTENSION: ICD-10-CM

## 2022-11-15 DIAGNOSIS — R68.89 OTHER GENERAL SYMPTOMS AND SIGNS: ICD-10-CM

## 2022-11-15 DIAGNOSIS — Z23 ENCOUNTER FOR IMMUNIZATION: ICD-10-CM

## 2022-11-15 DIAGNOSIS — Z00.00 ENCOUNTER FOR GENERAL ADULT MEDICAL EXAMINATION W/OUT ABNORMAL FINDINGS: ICD-10-CM

## 2022-11-15 DIAGNOSIS — E03.9 HYPOTHYROIDISM, UNSPECIFIED: ICD-10-CM

## 2022-11-15 PROCEDURE — G0439: CPT

## 2022-11-15 PROCEDURE — G0008: CPT

## 2022-11-15 PROCEDURE — 90662 IIV NO PRSV INCREASED AG IM: CPT

## 2022-11-15 NOTE — ASSESSMENT
[FreeTextEntry1] : As stated above marked confusion\par Did not want blood work \par Monthly B12 injections \par No change in medication for now\par Will likely discuss with her family the confusion ;\par Question of medication

## 2022-11-15 NOTE — HISTORY OF PRESENT ILLNESS
[FreeTextEntry1] : All notes reviewed; \par Some memory problems  [de-identified] : Exercise;\par Children alive and well\par Marked forgetfulness

## 2022-11-15 NOTE — HEALTH RISK ASSESSMENT
[Fair] :  ~his/her~ mood as fair [Former] : Former [No] : No [No falls in past year] : Patient reported no falls in the past year [Patient reported mammogram was normal] : Patient reported mammogram was normal [Patient reported PAP Smear was normal] : Patient reported PAP Smear was normal [HIV test declined] : HIV test declined [Hepatitis C test declined] : Hepatitis C test declined [MammogramDate] : 02/2022 [PapSmearDate] : 02/2022 [BoneDensityComments] : will get one [ColonoscopyDate] : 02/2022

## 2022-11-16 ENCOUNTER — NON-APPOINTMENT (OUTPATIENT)
Age: 77
End: 2022-11-16

## 2023-01-24 ENCOUNTER — NON-APPOINTMENT (OUTPATIENT)
Age: 78
End: 2023-01-24

## 2023-11-03 ENCOUNTER — APPOINTMENT (OUTPATIENT)
Dept: NEUROLOGY | Facility: CLINIC | Age: 78
End: 2023-11-03
Payer: MEDICARE

## 2023-11-03 VITALS
HEART RATE: 80 BPM | SYSTOLIC BLOOD PRESSURE: 120 MMHG | DIASTOLIC BLOOD PRESSURE: 64 MMHG | BODY MASS INDEX: 28.13 KG/M2 | WEIGHT: 134 LBS | HEIGHT: 58 IN | TEMPERATURE: 98.3 F | OXYGEN SATURATION: 96 %

## 2023-11-03 DIAGNOSIS — R46.89 OTHER SYMPTOMS AND SIGNS INVOLVING COGNITIVE FUNCTIONS AND AWARENESS: ICD-10-CM

## 2023-11-03 DIAGNOSIS — R41.89 OTHER SYMPTOMS AND SIGNS INVOLVING COGNITIVE FUNCTIONS AND AWARENESS: ICD-10-CM

## 2023-11-03 DIAGNOSIS — G31.84 MILD COGNITIVE IMPAIRMENT, SO STATED: ICD-10-CM

## 2023-11-03 PROCEDURE — 99205 OFFICE O/P NEW HI 60 MIN: CPT

## 2023-11-03 RX ORDER — LEVOTHYROXINE SODIUM 88 UG/1
88 CAPSULE ORAL
Refills: 0 | Status: ACTIVE | COMMUNITY

## 2023-11-03 RX ORDER — ADHESIVE TAPE 3"X 2.3 YD
180 MG TAPE, NON-MEDICATED TOPICAL
Refills: 0 | Status: ACTIVE | COMMUNITY

## 2023-11-03 RX ORDER — CALCIUM CARBONATE 500(1250)
500 TABLET ORAL
Refills: 0 | Status: ACTIVE | COMMUNITY

## 2024-05-28 ENCOUNTER — APPOINTMENT (OUTPATIENT)
Dept: NEUROLOGY | Facility: CLINIC | Age: 79
End: 2024-05-28
Payer: MEDICARE

## 2024-05-28 VITALS
HEART RATE: 98 BPM | HEIGHT: 58 IN | WEIGHT: 135 LBS | BODY MASS INDEX: 28.34 KG/M2 | DIASTOLIC BLOOD PRESSURE: 78 MMHG | SYSTOLIC BLOOD PRESSURE: 152 MMHG

## 2024-05-28 PROCEDURE — 99215 OFFICE O/P EST HI 40 MIN: CPT

## 2024-05-28 NOTE — HISTORY OF PRESENT ILLNESS
[FreeTextEntry1] : Informant: patient with daughter    STEVE BLAKE is a 79 year woman who is here for cognitive evaluation. Patient reports she does not remember changes in her cognitive. She said  " I've noticed recognizing something - until now". Daughter reports 7 years ago they noticed symptoms, but she is unclear what the earlier symptoms were.  She went to [place] after hitting head on microwave 7-8 years ago but they "noticed a little of something".  She was forgetting names of celebrity. Now she is forgetting, conversations, events, repeats and asks same questions.  She sometimes thinks daughter is her friend on the phone. She has word finding issues and forgets daughters name. She was evaluated at [facility] 3-4 years ago - she was told she has dementia Alzheimer's. Patient does not remember. Last evaluated at [place] and they wanted to start Aricept but patient did not want.   Evaluated by neurologist Ney Reveles 3/12/2023 78-year-old woman referred for cognitive and behavioral changes of about 3 years. Of note she has an appointment with Hi Aguillon who is a cognitive neurologist September 3, 2024 and has previously Being scheduled to see Teri Manuel 2021 for neuropsychological testing but canceled the appointment. Her primary care doctor is Rosette Calloway. Symptoms. The patient was accompanied by her daughter who provided some of the history. 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 She has noticed considerable difficulty with her attention, working and episodic memory. In addition to this she has considerable difficulty with word finding and fluency. She occasionally gets lost even in familiar neighborhoods. She can dress herself and cook very simple meals. She can plan ahead fibric grocery shopping, but tends to get lost in unfamiliar places. She has limited social interactions outside of those with her daughter. She has periods of being demanding but denies loss of self-control. There are some depressive symptoms such as crying and sadness. She also has fear of going outside related to some of the troubles going on in the world.  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 no scan  -neuropsych testing?? [place] -MMSE 2024 orientation 3/10 -knew only country and hospital, floor, world backward - only D, recall 1/3, called watch -clock, poor drawing,   Neuro ROS: -Hx head trauma, developmental/birth defects denies -Headache: denies -Incontinence: denies -Vertigo/lightheadedness: denies -Seizures: denies -Weakness: denies -Sensory changes: denies numbness or paresthesia's -Changes in taste/smell: denies -Visual changes: denies -Gait/Balance/falls: denies change -Tremor/abnormal movements: denies -Dysphagia: denies -Syncope/autonomic dysfunction: denies -Fluctuations in consciousness: denies   Neuropsychiatric: -Sleep:  Denies difficulty falling or staying asleep. ? verbalizations, movements, abnormal dreams movements or snoring, -Appetite: denies weight or appetite changes -Anxiety: some anxiety -Depression/Apathy: denies changes in interests, energy, guilt, or hopelessness -Attention/focusing/concentration: no  -Suicidal/Homicidal ideations: denies -Hallucinations/illusions: denies   PHQ2 over the last 2 weeks do u have?  (0-none, 1-several days, 2-more than half days, 3-nearly every day) -little interest or pleasure in doing things:0 -feeling down, depressed, or hopeless:0   FUNCTIONALITY  QUESTIONS (ACTIVITIES of DAILY LIVING ([place])        Completely independent (2)  Needs some help (1)  Unable, or needs major assistance (0) Bathing/Showerin Dressin Toiletin Transferrin Continence:2 Feedin Total score (0-12) =12 Lower score = greater impairment   INDEPENDENT ACTIVITIES of DAILY LIVING ([place]) Ability to Use Telephone: 2 Shoppin Food Preparation: never cooked, gets delivery food Housekeeping: kids help Laundry: kids help Transportation:   walks around Responsibility for Own Medications: ?  unclear daughter thinks- they hired an aide but there was issues Ability to Handle Finances:  daughter took over past year - she was forgetting Total score (0-16) =   SOCIAL HX -Smoking Hx: denies -Illicit drugs: denies -Alcohol Hx: denies -Birthplace: [place], immigrated at age 2  -Marital status:  -Lives with: alone -Children:2 -Occupation: psychotherapist , stopped    FAMILY HX -Dementia: mother AD ? age  ALLERGIES -?? they deny allergy to aspirin or pantoprazole that is in allscripts   MEDs -Atorvastatin 40mg -levothyroxine  -lisinopril 10mg   ROS Constitutional:  No fevers or chills.  No fatigue. Eye: No blurred vision, diplopia, eye pain or visual problems. Head/Ear/Nose/Throat: No hearing loss, tinnitus, sore throat or ear pain. Respiratory: No cough, wheezing or shortness of breath. Cardiovascular: No chest pain, palpitations or dyspnea on exertion. Gastrointestinal: No nausea, vomiting, constipation or diarrhea. Genitourinary: No incontinence, urgency or frequency. Integument/breast: No skin lesions. Hematologic/lymphatic: No blood clots, easy bruising/bleeding or anemia. Endocrine: +thyroid disorders , no diabetes. Musculoskeletal: chronic right hip pain  Neurological: see HPI Psychiatric: see HPI   GENERAL EXAMINATION General:  Pleasant, well groomed, and in no apparent distress. Skin: Anicteric with no significant lesions noted. Eyes: Anicteric Head/Ears/Mouth/Nose/Throat: NC/AT, conjunctiva clear. Neck: Supple, full ROM. Respiratory: No respiratory distress Cardiovascular: Regular rate and rhythm. Gastrointestinal: Soft, non-tender, non-distended with no masses. Extremities: No edema or calf tenderness, Back: No deformities, no spinal tenderness   NEUROLOGIC EXAMINATION MS: awake, alert, pleasant speech fluent but some difficulty with low frequency words, disoriented, repeated self multiple times in conversation. tangential MMSE 13/30, Visual fields: Full without extinction Cranial Nerves: PERRL, EOMI without nystagmus, facial sensation intact, face symmetric, hearing intact to fingerrub, palate raises symmetrically, shoulder shrug intact, tongue midline. No dysarthria. Motor:  -Tone:  normal -Strength: No pronator drift. Strength is 5/5 in bilateral upper and lower extremities Adventitial movements: No tremors noted. Sensation: grossly intact. Romberg is negative. Reflexes: 3+ at brachioradialis, biceps, triceps, patellar, and achilles bilaterally. Coordination: Intact with finger-to-nose bilaterally. Gait: mildly antalgic gait due to hip pain, Steady, with a narrow base. Able to walk on heels and toes. Able to tandem.  Normal pull test Frontal release signs: No grasp reflex. No palmomental reflex. No glabellar reflex.   ASSESSMENT: STEVE BLAKE is a 79 year with a progressive cognitive decline over the past- past described as poor memory, word finding difficulties and difficulty with executive tasks. Some functional decline . MSE . A progressive cognitive decline in more than one cognitive domain, affecting her independence, is consistent with dementia. Most likely diagnosis based on symptoms, exam, age and family history is Alzheimer disease                        PLAN:  -Advanced Directives: HCP's daughters  Cognitive symptoms: -Start donepezil (Aricept). Side effects reviewed with patient and family- patient does not want to try - will wait -encouraged to call me -obtain imaging  - encouraged daughter to start monitoring pill box -Encouraged exercise (stressed the importance of exercise for its benefits on cognition, cerebrovascular disease, mood, sleep regulation and weight loss) -Recommend continued supervision Anxiety -tolerable.      Hypertension -Recommend primary care doctor continue to modify cerebrovascular risk factors   Follow-up: 3-6 months

## 2024-09-03 ENCOUNTER — APPOINTMENT (OUTPATIENT)
Dept: NEUROLOGY | Facility: CLINIC | Age: 79
End: 2024-09-03